# Patient Record
Sex: FEMALE | URBAN - METROPOLITAN AREA
[De-identification: names, ages, dates, MRNs, and addresses within clinical notes are randomized per-mention and may not be internally consistent; named-entity substitution may affect disease eponyms.]

---

## 2022-01-24 ENCOUNTER — APPOINTMENT (OUTPATIENT)
Dept: URBAN - METROPOLITAN AREA CLINIC 268 | Age: 47
Setting detail: DERMATOLOGY
End: 2022-01-25

## 2022-01-24 DIAGNOSIS — L72.11 PILAR CYST: ICD-10-CM

## 2022-01-24 PROCEDURE — OTHER COUNSELING: OTHER

## 2022-01-24 PROCEDURE — 99202 OFFICE O/P NEW SF 15 MIN: CPT

## 2022-01-24 PROCEDURE — OTHER OBSERVATION AND MEASURE: OTHER

## 2022-01-24 PROCEDURE — OTHER MIPS QUALITY: OTHER

## 2022-01-24 PROCEDURE — OTHER OBSERVATION: OTHER

## 2022-01-24 ASSESSMENT — LOCATION DETAILED DESCRIPTION DERM
LOCATION DETAILED: RIGHT MEDIAL FRONTAL SCALP
LOCATION DETAILED: LEFT SUPERIOR OCCIPITAL SCALP
LOCATION DETAILED: MID-OCCIPITAL SCALP
LOCATION DETAILED: LEFT SUPERIOR OCCIPITAL SCALP
LOCATION DETAILED: RIGHT SUPERIOR OCCIPITAL SCALP
LOCATION DETAILED: RIGHT MEDIAL FRONTAL SCALP

## 2022-01-24 ASSESSMENT — LOCATION SIMPLE DESCRIPTION DERM
LOCATION SIMPLE: POSTERIOR SCALP
LOCATION SIMPLE: RIGHT SCALP
LOCATION SIMPLE: LEFT OCCIPITAL SCALP
LOCATION SIMPLE: LEFT OCCIPITAL SCALP
LOCATION SIMPLE: RIGHT SCALP
LOCATION SIMPLE: POSTERIOR SCALP

## 2022-01-24 ASSESSMENT — LOCATION ZONE DERM
LOCATION ZONE: SCALP
LOCATION ZONE: SCALP

## 2022-01-24 NOTE — PROCEDURE: OBSERVATION
Size Of Lesion: 0.8cm
Size Of Lesion: 2.5cm
Detail Level: Detailed
Body Location Override (Optional - Billing Will Still Be Based On Selected Body Map Location If Applicable): front scalp
Body Location Override (Optional - Billing Will Still Be Based On Selected Body Map Location If Applicable): mid scalp
Size Of Lesion: 1.5cm
Body Location Override (Optional - Billing Will Still Be Based On Selected Body Map Location If Applicable): posterior scalp

## 2022-01-24 NOTE — HPI: BUMPS
Additional History: Pt states that she has noticed the three bumps while playing with her hair. They are not painful but has noticed that they are growing in size. Pt normally see NP TOMI LANIER at [facility]. Pt was instructed to see us for possible bx. Additional History: Pt states that she has noticed the three bumps while playing with her hair. They are not painful but has noticed that they are growing in size. Pt normally see NP TOMI LANIER at [facility]. Pt was instructed to see us for possible bx.